# Patient Record
Sex: FEMALE | Race: WHITE
[De-identification: names, ages, dates, MRNs, and addresses within clinical notes are randomized per-mention and may not be internally consistent; named-entity substitution may affect disease eponyms.]

---

## 2023-10-05 ENCOUNTER — HOSPITAL ENCOUNTER (EMERGENCY)
Dept: HOSPITAL 76 - ED | Age: 28
Discharge: HOME | End: 2023-10-05
Payer: COMMERCIAL

## 2023-10-05 VITALS — SYSTOLIC BLOOD PRESSURE: 105 MMHG | DIASTOLIC BLOOD PRESSURE: 73 MMHG

## 2023-10-05 VITALS — OXYGEN SATURATION: 100 %

## 2023-10-05 DIAGNOSIS — O20.0: Primary | ICD-10-CM

## 2023-10-05 DIAGNOSIS — Z3A.14: ICD-10-CM

## 2023-10-05 LAB
ALBUMIN DIAFP-MCNC: 4.2 G/DL (ref 3.2–5.5)
ALBUMIN/GLOB SERPL: 1.4 {RATIO} (ref 1–2.2)
ALP SERPL-CCNC: 52 IU/L (ref 42–121)
ALT SERPL W P-5'-P-CCNC: 7 IU/L (ref 10–60)
ANION GAP SERPL CALCULATED.4IONS-SCNC: 9 MMOL/L (ref 6–13)
AST SERPL W P-5'-P-CCNC: 13 IU/L (ref 10–42)
BASOPHILS NFR BLD AUTO: 0 10^3/UL (ref 0–0.1)
BASOPHILS NFR BLD AUTO: 0.3 %
BILIRUB BLD-MCNC: 0.5 MG/DL (ref 0.2–1)
BUN SERPL-MCNC: 9 MG/DL (ref 6–20)
CALCIUM UR-MCNC: 8.9 MG/DL (ref 8.5–10.3)
CHLORIDE SERPL-SCNC: 103 MMOL/L (ref 101–111)
CO2 SERPL-SCNC: 24 MMOL/L (ref 21–32)
CREAT SERPLBLD-SCNC: 0.4 MG/DL (ref 0.6–1.3)
EOSINOPHIL # BLD AUTO: 0.1 10^3/UL (ref 0–0.7)
EOSINOPHIL NFR BLD AUTO: 1.9 %
ERYTHROCYTE [DISTWIDTH] IN BLOOD BY AUTOMATED COUNT: 12.5 % (ref 12–15)
GFRSERPLBLD MDRD-ARVRAT: 193 ML/MIN/{1.73_M2} (ref 89–?)
GLOBULIN SER-MCNC: 3 G/DL (ref 2.1–4.2)
GLUCOSE SERPL-MCNC: 85 MG/DL (ref 74–104)
HCT VFR BLD AUTO: 35.3 % (ref 37–47)
HGB UR QL STRIP: 12.2 G/DL (ref 12–16)
LIPASE SERPL-CCNC: 39 U/L (ref 11–82)
LYMPHOCYTES # SPEC AUTO: 1.5 10^3/UL (ref 1.5–3.5)
LYMPHOCYTES NFR BLD AUTO: 22.7 %
MCH RBC QN AUTO: 31.8 PG (ref 27–31)
MCHC RBC AUTO-ENTMCNC: 34.6 G/DL (ref 32–36)
MCV RBC AUTO: 91.9 FL (ref 81–99)
MONOCYTES # BLD AUTO: 0.4 10^3/UL (ref 0–1)
MONOCYTES NFR BLD AUTO: 5.8 %
NEUTROPHILS # BLD AUTO: 4.7 10^3/UL (ref 1.5–6.6)
NEUTROPHILS # SNV AUTO: 6.8 X10^3/UL (ref 4.8–10.8)
NEUTROPHILS NFR BLD AUTO: 68.9 %
NRBC # BLD AUTO: 0 /100WBC
NRBC # BLD AUTO: 0 X10^3/UL
PDW BLD AUTO: 9.5 FL (ref 7.9–10.8)
PLATELET # BLD: 299 10^3/UL (ref 130–450)
POTASSIUM SERPL-SCNC: 3.5 MMOL/L (ref 3.5–4.5)
PROT SPEC-MCNC: 7.2 G/DL (ref 6.4–8.9)
RBC MAR: 3.84 10^6/UL (ref 4.2–5.4)
SODIUM SERPLBLD-SCNC: 136 MMOL/L (ref 135–145)

## 2023-10-05 PROCEDURE — 86900 BLOOD TYPING SEROLOGIC ABO: CPT

## 2023-10-05 PROCEDURE — 83690 ASSAY OF LIPASE: CPT

## 2023-10-05 PROCEDURE — 36415 COLL VENOUS BLD VENIPUNCTURE: CPT

## 2023-10-05 PROCEDURE — 99283 EMERGENCY DEPT VISIT LOW MDM: CPT

## 2023-10-05 PROCEDURE — 80053 COMPREHEN METABOLIC PANEL: CPT

## 2023-10-05 PROCEDURE — 85025 COMPLETE CBC W/AUTO DIFF WBC: CPT

## 2023-10-05 PROCEDURE — 86901 BLOOD TYPING SEROLOGIC RH(D): CPT

## 2023-10-05 NOTE — ED PHYSICIAN DOCUMENTATION
PD HPI FEMALE 





- Stated complaint


Stated Complaint: 14 WKS PREG/BLEEDING





- History obtained from


History obtained from: Patient





- Additional information


Additional information: 








Patient is a 28-year-old female,  presenting for evaluation of vaginal 

bleeding starting this morning.She is approximately 14 weeks pregnant.  She has 

had some episodes of bleeding in this pregnancy and was previously seen in the 

emergency department at [facility] in [place].  She is following with the 

nurse midwives there.  She was told that she has a bleed around her placenta.  

She said that the bleeding had stopped but started again this morning.  She has 

gone through 2 pads.  She reports that it has lightened up Since this morning.No

dizziness, chest pain, shortness of air.  Patient is currently in the custody of

the [facility].








ER Visit 23


RH Positive


H/H: 12.2/345





[facility] 2023


Single viable intrauterine gestation. Fetal heart rate 166 bpm.


Crown-rump length 5. 7 cm; 12 weeks 2 days.





Placenta posterior. Right posterior lateral periplacental hemorrhage


measuring 2.7 cm x 2.3 cm x 4.2 cm. Left posterior para placental


hemorrhage measuring 7.8 cm x 1.2 cm x 5.7 cm.





Cervix closed measuring 3.3 cm.





Right and left ovaries normal.





No evidence of pelvic ascites.








Review of Systems


Constitutional: denies: Fever


Cardiac: denies: Chest pain / pressure


Respiratory: denies: Dyspnea


GI: denies: Abdominal Pain


: reports: Vaginal bleeding





PD PAST MEDICAL HISTORY





- Present Medications


Home Medications: 


                                Ambulatory Orders











 Medication  Instructions  Recorded  Confirmed


 


Dextroamphetamine/Amphetamine 5 mg PO DAILY 10/05/23 10/05/23





[Dextroamp-Amphetamine 5 mg Tab]   


 


Prenatal Vit No.129/Iron/Folic 1 each PO DAILY #30 tablet 10/05/23 





[Prenatal Tablet]   


 


Sertraline [Zoloft] 50 mg PO DAILY 10/05/23 10/05/23














- Allergies


Allergies/Adverse Reactions: 


                                    Allergies











Allergy/AdvReac Type Severity Reaction Status Date / Time


 


No Known Drug Allergies Allergy   Verified 10/05/23 15:36














PD ED PE NORMAL





- General


General: Alert and oriented X 3, No acute distress, Well developed/nourished





- HEENT


HEENT: Atraumatic, Moist mucous membranes, Pharynx benign





- Neck


Neck: Supple, no meningeal sign





- Cardiac


Cardiac: RRR, No murmur





- Respiratory


Respiratory: No respiratory distress, Clear bilaterally





- Abdomen


Abdomen: Normal bowel sounds, Soft, Non tender, Non distended





- Female 


Female : Deferred





- Derm


Derm: Warm and dry





Results





- Vitals


Vitals: 


                               Vital Signs - 24 hr











  10/05/23 10/05/23





  15:34 16:39


 


Temperature 36.3 C L 


 


Heart Rate 90 84


 


Respiratory 15 15





Rate  


 


Blood Pressure 110/76 105/73


 


O2 Saturation 100 100








                                     Oxygen











O2 Source                      Room air

















- Labs


Labs: 


                                Laboratory Tests











  10/05/23 10/05/23 10/05/23





  15:47 15:47 15:47


 


WBC   6.8 


 


RBC   3.84 L 


 


Hgb   12.2 


 


Hct   35.3 L 


 


MCV   91.9 


 


MCH   31.8 H 


 


MCHC   34.6 


 


RDW   12.5 


 


Plt Count   299 


 


MPV   9.5 


 


Neut # (Auto)   4.7 


 


Lymph # (Auto)   1.5 


 


Mono # (Auto)   0.4 


 


Eos # (Auto)   0.1 


 


Baso # (Auto)   0.0 


 


Absolute Nucleated RBC   0.00 


 


Nucleated RBC %   0.0 


 


Sodium    136


 


Potassium    3.5


 


Chloride    103


 


Carbon Dioxide    24


 


Anion Gap    9.0


 


BUN    9


 


Creatinine    0.4 L


 


Estimated GFR (MDRD)    193


 


Glucose    85


 


Calcium    8.9


 


Total Bilirubin    0.5


 


AST    13


 


ALT    7 L


 


Alkaline Phosphatase    52


 


Total Protein    7.2


 


Albumin    4.2


 


Globulin    3.0


 


Albumin/Globulin Ratio    1.4


 


Lipase    39


 


Blood Type  O POSITIVE  














Procedures





- Bedside sono


Bedside sono by EMP: 


Fetal ultrasound: +Fetal activity, quite active, +Cardiac activity with 

estimated fetal heart rate around 143 bpm








PD Medical Decision Making





- ED course


Complexity details: reviewed results, re-evaluated patient, d/w patient


ED course: 


Patient is a 28-year-old female, approximately 14 weeks pregnant, presenting for

 evaluation of vaginal bleeding starting this morning.  She has gone through 2 

pads.  Denies any associated pain.  Has been previously seen for vaginal 

bleeding with this pregnancy with a subchorionic hemorrhage.  She is currently 

in the custody of [facility].  Abdominal exam is benign.  Bedside 

ultrasound was performed showing Good fetal movement and cardiac activity for 

the fetus.  CBC, chemistry were obtained and reviewed and without significant 

findings.  Hemoglobin is stable when compared with prior labs from [facility] 

in [place].  Blood type is O+ thus she does not need RhoGAM.  Patient was 

given a prescription for prenatal vitamins.  She was encouraged close follow-up 

with OB.  She is also counseled on strict return precautions for any worsening 

symptoms.








Departure





- Departure


Disposition: 01 Home, Self Care


Clinical Impression: 


 Threatened miscarriage





Condition: Stable


Instructions:  ED Miscarriage Poss


Prescriptions: 


Prenatal Vit No.129/Iron/Folic [Prenatal Tablet] 1 each PO DAILY #30 tablet


Comments: 


You are experiencing bleeding in early pregnancy.  At this can be common and 

your pregnancy may continue normally without further complications.  However 

sometimes bleeding can also be a sign of a miscarriage.  Your ultrasound shows 

good activity and heartbeat from the fetus.  I would recommend close follow-up 

with your OB.  Return to the emergency department with any worsening symptoms 

such as saturating a pad an hour for a few hours, dizziness, pelvic pain or any 

concerns.  I am also printing a prescription for prenatal vitamins.


Discharge Date/Time: 10/05/23 16:41